# Patient Record
Sex: FEMALE | Race: OTHER | HISPANIC OR LATINO | URBAN - METROPOLITAN AREA
[De-identification: names, ages, dates, MRNs, and addresses within clinical notes are randomized per-mention and may not be internally consistent; named-entity substitution may affect disease eponyms.]

---

## 2018-07-13 ENCOUNTER — EMERGENCY (EMERGENCY)
Facility: HOSPITAL | Age: 33
LOS: 0 days | Discharge: HOME | End: 2018-07-14
Attending: EMERGENCY MEDICINE | Admitting: EMERGENCY MEDICINE

## 2018-07-13 VITALS
DIASTOLIC BLOOD PRESSURE: 69 MMHG | HEART RATE: 59 BPM | TEMPERATURE: 96 F | RESPIRATION RATE: 20 BRPM | SYSTOLIC BLOOD PRESSURE: 121 MMHG | OXYGEN SATURATION: 99 %

## 2018-07-13 DIAGNOSIS — R21 RASH AND OTHER NONSPECIFIC SKIN ERUPTION: ICD-10-CM

## 2018-07-14 NOTE — ED PROVIDER NOTE - PHYSICAL EXAMINATION
CONSTITUTIONAL: Well-developed; well-nourished; in no acute distress, nontoxic appearing  SKIN: + patchy blanching erythematous lesions on b/l distal legs medially , non tender to palpation, skin exam is warm and dry otherwise  CARD: S1, S2 normal, no murmur  RESP: No wheezes, rales or rhonchi. Good air movement bilaterally  ABD: soft; non-distended; non-tender. No Rebound, No guarding  PSYCH: Cooperative, appropriate.

## 2018-07-14 NOTE — ED PROVIDER NOTE - NS ED ROS FT
Constitutional:  no fevers, no chills, no malaise  Cardiac:  No chest pain  Respiratory:  No cough or sob  MS:  No back pain, no joint pain.  Skin:  As per HPI

## 2018-07-14 NOTE — ED PROVIDER NOTE - OBJECTIVE STATEMENT
32 yr F with complaints of b/l leg rash that she noted yesterday. Rash is painful and pleuritis. No f/c, trauma, joint pain. Pt is otherwise healthy.

## 2018-07-14 NOTE — ED PROVIDER NOTE - PROGRESS NOTE DETAILS
Pt with non specific rashes. Will d/c with dermatology f/up. Pt instructed to  Hydrocortisone cream and start on Hydrocortisone.